# Patient Record
(demographics unavailable — no encounter records)

---

## 2018-03-10 NOTE — ED NOTES
Patient was found in an apparent intoxicated state at the [place] [address] according to EMS. The patient is slurring her words and has vomited twice since arrival. Will continue to monitor closely.

## 2018-03-10 NOTE — ED PROVIDER NOTES
Patient Seen in: [facility] AND [facility] Emergency Department    History   Patient presents with:  Alcohol Intoxication (neurologic)    Stated Complaint: ETOH    HPI    Patient is a 45-year-old female who presents to the emergency department with a chief compl Pulmonary/Chest: Effort normal.   Abdominal: Normal appearance and bowel sounds are normal. She exhibits no distension, no fluid wave and no ascites. There is no tenderness. There is no rigidity. Musculoskeletal: Normal range of motion.    Neurological: Will discharge home to care of father.           Disposition and Plan     Clinical Impression:  Alcoholic intoxication without complication ([address]  (primary encounter diagnosis)    Disposition:  Discharge  3/10/2018  6:53 pm    Follow-up:  Sumanth Vasquez MD

## 2018-03-10 NOTE — ED NOTES
Father of patient, Lainey Alvarado, is at bedside. He states that the patient's sister says that the patient was drinking at the parade and at a bar today.

## 2018-03-11 NOTE — ED NOTES
Patient is cleared for discharge per Emergency Department provider. Discharge instructions reviewed with patient including when and how to follow up with healthcare providers and when to seek emergency care.  Medications were reviewed and prescription deta

## 2018-10-23 PROCEDURE — 88175 CYTOPATH C/V AUTO FLUID REDO: CPT | Performed by: OBSTETRICS & GYNECOLOGY

## (undated) NOTE — ED AVS SNAPSHOT
Susan Bullard   MRN: [id number]    Department:  [facility] Emergency Department   Date of Visit:  3/10/2018           Disclosure     Insurance plans vary and the physician(s) referred by the ER may not be covered by your plan.  Please contact CARE PHYSICIAN AT ONCE OR RETURN IMMEDIATELY TO THE EMERGENCY DEPARTMENT. If you have been prescribed any medication(s), please fill your prescription right away and begin taking the medication(s) as directed.   If you believe that any of the medications